# Patient Record
Sex: FEMALE | Race: BLACK OR AFRICAN AMERICAN | Employment: STUDENT | URBAN - NONMETROPOLITAN AREA
[De-identification: names, ages, dates, MRNs, and addresses within clinical notes are randomized per-mention and may not be internally consistent; named-entity substitution may affect disease eponyms.]

---

## 2018-01-17 ENCOUNTER — HOSPITAL ENCOUNTER (EMERGENCY)
Age: 23
Discharge: HOME | End: 2018-01-17
Attending: EMERGENCY MEDICINE
Payer: COMMERCIAL

## 2018-01-17 VITALS
TEMPERATURE: 97.7 F | RESPIRATION RATE: 12 BRPM | SYSTOLIC BLOOD PRESSURE: 121 MMHG | HEART RATE: 72 BPM | OXYGEN SATURATION: 99 % | WEIGHT: 150 LBS | DIASTOLIC BLOOD PRESSURE: 75 MMHG

## 2018-01-17 DIAGNOSIS — H10.31 ACUTE CONJUNCTIVITIS OF RIGHT EYE, UNSPECIFIED ACUTE CONJUNCTIVITIS TYPE: ICD-10-CM

## 2018-01-17 DIAGNOSIS — H11.31 CONJUNCTIVAL HEMORRHAGE OF RIGHT EYE: Primary | ICD-10-CM

## 2018-01-17 PROCEDURE — 99203 OFFICE O/P NEW LOW 30 MIN: CPT | Performed by: EMERGENCY MEDICINE

## 2018-01-17 PROCEDURE — 99202 OFFICE O/P NEW SF 15 MIN: CPT

## 2018-01-17 ASSESSMENT — ENCOUNTER SYMPTOMS
WHEEZING: 0
TROUBLE SWALLOWING: 0
BLOOD IN STOOL: 0
EYE DISCHARGE: 0
VOMITING: 0
BACK PAIN: 0
FACIAL SWELLING: 0
CHOKING: 0
PHOTOPHOBIA: 0
DIARRHEA: 0
STRIDOR: 0
EYE REDNESS: 1
VOICE CHANGE: 0
ABDOMINAL PAIN: 0
SORE THROAT: 0
EYE PAIN: 0
EYE ITCHING: 1
CONSTIPATION: 0
SINUS PRESSURE: 0
COUGH: 0
SHORTNESS OF BREATH: 0
NAUSEA: 0

## 2018-01-17 NOTE — ED PROVIDER NOTES
medical history. SURGICAL HISTORY     Patient  has no past surgical history on file. CURRENT MEDICATIONS       Previous Medications    No medications on file       ALLERGIES     Patient is has No Known Allergies. FAMILY HISTORY     Patient's family history includes No Known Problems in her father. SOCIAL HISTORY     Patient  reports that she has never smoked. She has never used smokeless tobacco. She reports that she drinks alcohol. She reports that she does not use drugs. PHYSICAL EXAM     ED TRIAGE VITALS  BP: 121/75, Temp: 97.7 °F (36.5 °C), Pulse: 72, Resp: 12, SpO2: 99 %  Physical Exam   Constitutional: She is oriented to person, place, and time. She appears well-developed and well-nourished. No distress. Moist membranes, normal airway   HENT:   Head: Normocephalic and atraumatic. Right Ear: Tympanic membrane and external ear normal.   Left Ear: Tympanic membrane and external ear normal.   Nose: Nose normal. Right sinus exhibits no maxillary sinus tenderness and no frontal sinus tenderness. Left sinus exhibits no maxillary sinus tenderness and no frontal sinus tenderness. Mouth/Throat: Oropharynx is clear and moist. No trismus in the jaw. No uvula swelling. No oropharyngeal exudate, posterior oropharyngeal edema, posterior oropharyngeal erythema or tonsillar abscesses. Eyes: Conjunctivae and EOM are normal. Pupils are equal, round, and reactive to light. Right eye exhibits no discharge. Left eye exhibits no discharge. Right conjunctiva is not injected. Right conjunctiva has no hemorrhage. Left conjunctiva is not injected. Left conjunctiva has no hemorrhage. No scleral icterus. Right eye exhibits normal extraocular motion. Left eye exhibits normal extraocular motion. Right subconjunctival hemorrhage medial aspect. Patient erythematous conjunctiva possibly due to early conjunctivitis. Anterior chamber clear. No photophobia.   Cornea normal.  No foreign body   Neck: Normal range of SpO2: 99%   Weight: 150 lb (68 kg)       Medications - No data to display  PROCEDURES:  None  FINAL IMPRESSION      1. Conjunctival hemorrhage of right eye    2. Acute conjunctivitis of right eye, unspecified acute conjunctivitis type        DISPOSITION/PLAN   DISPOSITION  Nontoxic, well-hydrated, normal airway. Patient has subconjunctival hemorrhage of the right eye medial aspect, with possible early conjunctivitis. No deep eye structure infection or increased intraocular pressure. No orbital or periorbital cellulitis. No evidence of trauma or foreign body. Will treat with Maxitrol, Tylenol, rest.  Patient to recheck with PCP in 6 days if problems persist, and understands to go to ED if worse.     PATIENT REFERRED TO:  Nirav [address]  [address] [facility]  [phone]    Schedule an appointment as soon as possible for a visit in 6 days  recheck if problems persist, go to emergency if worse    DISCHARGE MEDICATIONS:  New Prescriptions    NEOMYCIN-POLYMYXIN-DEXAMETHASONE (MAXITROL) 0.1 % OPHTHALMIC SUSPENSION    Place 1 drop into the right eye 3 times daily for 7 days     Current Discharge Medication List          MD Alex Blood MD  01/17/18 2024

## 2018-01-24 ENCOUNTER — OFFICE VISIT (OUTPATIENT)
Dept: FAMILY MEDICINE CLINIC | Age: 23
End: 2018-01-24
Payer: COMMERCIAL

## 2018-01-24 VITALS
TEMPERATURE: 98.2 F | DIASTOLIC BLOOD PRESSURE: 68 MMHG | BODY MASS INDEX: 27.5 KG/M2 | WEIGHT: 155.2 LBS | SYSTOLIC BLOOD PRESSURE: 110 MMHG | RESPIRATION RATE: 14 BRPM | HEIGHT: 63 IN | HEART RATE: 68 BPM

## 2018-01-24 DIAGNOSIS — Z00.00 PHYSICAL EXAM: Primary | ICD-10-CM

## 2018-01-24 DIAGNOSIS — Z23 NEEDS FLU SHOT: ICD-10-CM

## 2018-01-24 DIAGNOSIS — H11.31 SUBCONJUNCTIVAL HEMORRHAGE OF RIGHT EYE: ICD-10-CM

## 2018-01-24 PROCEDURE — 90686 IIV4 VACC NO PRSV 0.5 ML IM: CPT | Performed by: NURSE PRACTITIONER

## 2018-01-24 PROCEDURE — 90471 IMMUNIZATION ADMIN: CPT | Performed by: NURSE PRACTITIONER

## 2018-01-24 PROCEDURE — 99385 PREV VISIT NEW AGE 18-39: CPT | Performed by: NURSE PRACTITIONER

## 2018-01-24 ASSESSMENT — PATIENT HEALTH QUESTIONNAIRE - PHQ9
1. LITTLE INTEREST OR PLEASURE IN DOING THINGS: 0
SUM OF ALL RESPONSES TO PHQ QUESTIONS 1-9: 0
2. FEELING DOWN, DEPRESSED OR HOPELESS: 0
SUM OF ALL RESPONSES TO PHQ9 QUESTIONS 1 & 2: 0

## 2018-01-24 ASSESSMENT — ENCOUNTER SYMPTOMS
RESPIRATORY NEGATIVE: 1
PHOTOPHOBIA: 0
EYE DISCHARGE: 0
ABDOMINAL DISTENTION: 0
EYE REDNESS: 1
EYE PAIN: 0
EYE ITCHING: 0

## 2018-01-24 NOTE — PROGRESS NOTES
Problem Relation Age of Onset    No Known Problems Father      Social History   Substance Use Topics    Smoking status: Never Smoker    Smokeless tobacco: Never Used    Alcohol use Yes      Comment: wk ends        No Known Allergies    Health Maintenance   Topic Date Due    HIV screen  01/06/2012    Meningococcal (MCV) Vaccine Age 0-22 Years (1 of 1) 01/06/2013    Chlamydia screen  01/06/2013    DTaP/Tdap/Td vaccine (1 - Tdap) 01/06/2016    Cervical cancer screen  01/06/2018    Flu vaccine  Completed       Subjective:      Review of Systems   Constitutional: Negative for chills, fatigue and fever. HENT: Negative for congestion. Eyes: Positive for redness. Negative for photophobia, pain, discharge, itching and visual disturbance. Respiratory: Negative. Cardiovascular: Negative. Gastrointestinal: Negative for abdominal distention. Genitourinary: Negative for difficulty urinating and menstrual problem. Musculoskeletal: Negative. Skin: Negative. Neurological: Negative for dizziness and headaches. Psychiatric/Behavioral: Negative for self-injury, sleep disturbance and suicidal ideas. Objective:     /68 (Site: Right Arm, Position: Sitting)   Pulse 68   Temp 98.2 °F (36.8 °C) (Oral)   Resp 14   Ht 5' 2.75\" (1.594 m)   Wt 155 lb 3.2 oz (70.4 kg)   LMP 01/17/2018   BMI 27.71 kg/m²     Physical Exam   Constitutional: She is oriented to person, place, and time. She appears well-developed and well-nourished. No distress. HENT:   Right Ear: Hearing, tympanic membrane, external ear and ear canal normal.   Left Ear: Hearing, tympanic membrane, external ear and ear canal normal.   Nose: Nose normal.   Mouth/Throat: Oropharynx is clear and moist.   Eyes: EOM and lids are normal. Pupils are equal, round, and reactive to light. Lids are everted and swept, no foreign bodies found. Right eye exhibits no exudate and no hordeolum. No foreign body present in the right eye.  Left eye

## 2018-01-24 NOTE — LETTER
[place] [id number]  Phone: [phone]  Fax: [phone]    Jenna Segundo CNP        January 24, 2018     Patient: María Stanton   YOB: 1995   Date of Visit: 1/24/2018       To Whom it May Concern:    María Stanton was seen in my clinic on 1/24/2018. She may return to gym class or sports on Thursday January 25th, 2018. She may also resume her weight training. on Thursday January 25th, 2018. .    If you have any questions or concerns, please don't hesitate to call.     Sincerely,         Jenna Segundo CNP

## 2019-05-15 ENCOUNTER — HOSPITAL ENCOUNTER (OUTPATIENT)
Age: 24
Discharge: HOME | End: 2019-05-15
Payer: COMMERCIAL

## 2019-05-15 ENCOUNTER — HOSPITAL ENCOUNTER (OUTPATIENT)
Dept: GENERAL RADIOLOGY | Age: 24
Discharge: HOME | End: 2019-05-15
Payer: COMMERCIAL

## 2019-05-15 DIAGNOSIS — M25.532 LEFT WRIST PAIN: ICD-10-CM

## 2019-05-15 PROCEDURE — 73110 X-RAY EXAM OF WRIST: CPT

## 2019-06-04 ENCOUNTER — TELEPHONE (OUTPATIENT)
Dept: FAMILY MEDICINE CLINIC | Age: 24
End: 2019-06-04

## 2019-06-04 DIAGNOSIS — M25.532 LEFT WRIST PAIN: Primary | ICD-10-CM

## 2019-06-04 NOTE — TELEPHONE ENCOUNTER
[employer] 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 Left wrist pain. Order for MRI. Note in media tab.   Tali Cramer MD

## 2019-06-07 ENCOUNTER — TELEPHONE (OUTPATIENT)
Dept: FAMILY MEDICINE CLINIC | Age: 24
End: 2019-06-07

## 2019-06-07 ENCOUNTER — HOSPITAL ENCOUNTER (OUTPATIENT)
Dept: MRI IMAGING | Age: 24
Discharge: HOME | End: 2019-06-07
Payer: COMMERCIAL

## 2019-06-07 DIAGNOSIS — M25.532 LEFT WRIST PAIN: ICD-10-CM

## 2019-06-07 PROCEDURE — 73221 MRI JOINT UPR EXTREM W/O DYE: CPT

## 2019-06-07 NOTE — TELEPHONE ENCOUNTER
----- Message from Vlad Austin MD sent at 6/7/2019 11:48 AM EDT -----  There is a small cyst in the left wrist; MRI was otherwise unremarkable.